# Patient Record
Sex: MALE | Race: WHITE | Employment: FULL TIME | URBAN - METROPOLITAN AREA
[De-identification: names, ages, dates, MRNs, and addresses within clinical notes are randomized per-mention and may not be internally consistent; named-entity substitution may affect disease eponyms.]

---

## 2020-06-12 ENCOUNTER — HOSPITAL ENCOUNTER (OUTPATIENT)
Dept: PREADMISSION TESTING | Age: 55
Discharge: HOME | End: 2020-06-12
Attending: ORTHOPAEDIC SURGERY
Payer: COMMERCIAL

## 2020-06-12 LAB
ALBUMIN SERPL-MCNC: 3.4 G/DL (ref 3.4–5)
ALBUMIN/GLOB SERPL: 0.9 {RATIO} (ref 0.8–1.7)
ALP SERPL-CCNC: 106 U/L (ref 45–117)
ALT SERPL-CCNC: 25 U/L (ref 16–61)
ANION GAP SERPL CALC-SCNC: 6 MMOL/L (ref 3–18)
APTT PPP: 31.7 SEC (ref 23–36.4)
AST SERPL-CCNC: 15 U/L (ref 10–38)
ATRIAL RATE: 83 BPM
BASOPHILS # BLD: 0 K/UL (ref 0–0.1)
BASOPHILS NFR BLD: 0 % (ref 0–2)
BILIRUB SERPL-MCNC: 0.4 MG/DL (ref 0.2–1)
BUN SERPL-MCNC: 14 MG/DL (ref 7–18)
BUN/CREAT SERPL: 64 (ref 12–20)
CALCIUM SERPL-MCNC: 8.6 MG/DL (ref 8.5–10.1)
CALCULATED P AXIS, ECG09: 56 DEGREES
CALCULATED R AXIS, ECG10: 60 DEGREES
CALCULATED T AXIS, ECG11: 18 DEGREES
CHLORIDE SERPL-SCNC: 106 MMOL/L (ref 100–111)
CO2 SERPL-SCNC: 25 MMOL/L (ref 21–32)
CREAT SERPL-MCNC: 0.22 MG/DL (ref 0.6–1.3)
DIAGNOSIS, 93000: NORMAL
DIFFERENTIAL METHOD BLD: ABNORMAL
EOSINOPHIL # BLD: 0.2 K/UL (ref 0–0.4)
EOSINOPHIL NFR BLD: 3 % (ref 0–5)
ERYTHROCYTE [DISTWIDTH] IN BLOOD BY AUTOMATED COUNT: 16.9 % (ref 11.6–14.5)
EST. AVERAGE GLUCOSE BLD GHB EST-MCNC: 143 MG/DL
GLOBULIN SER CALC-MCNC: 3.8 G/DL (ref 2–4)
GLUCOSE SERPL-MCNC: 130 MG/DL (ref 74–99)
HBA1C MFR BLD: 6.6 % (ref 4.2–5.6)
HCT VFR BLD AUTO: 41.7 % (ref 36–48)
HGB BLD-MCNC: 13.2 G/DL (ref 13–16)
INR PPP: 1.1 (ref 0.8–1.2)
LYMPHOCYTES # BLD: 1.2 K/UL (ref 0.9–3.6)
LYMPHOCYTES NFR BLD: 14 % (ref 21–52)
MCH RBC QN AUTO: 26.8 PG (ref 24–34)
MCHC RBC AUTO-ENTMCNC: 31.7 G/DL (ref 31–37)
MCV RBC AUTO: 84.8 FL (ref 74–97)
MONOCYTES # BLD: 0.3 K/UL (ref 0.05–1.2)
MONOCYTES NFR BLD: 4 % (ref 3–10)
NEUTS SEG # BLD: 6.7 K/UL (ref 1.8–8)
NEUTS SEG NFR BLD: 79 % (ref 40–73)
P-R INTERVAL, ECG05: 156 MS
PLATELET # BLD AUTO: 300 K/UL (ref 135–420)
PMV BLD AUTO: 10.2 FL (ref 9.2–11.8)
POTASSIUM SERPL-SCNC: 4.3 MMOL/L (ref 3.5–5.5)
PROT SERPL-MCNC: 7.2 G/DL (ref 6.4–8.2)
PROTHROMBIN TIME: 13.9 SEC (ref 11.5–15.2)
Q-T INTERVAL, ECG07: 384 MS
QRS DURATION, ECG06: 82 MS
QTC CALCULATION (BEZET), ECG08: 451 MS
RBC # BLD AUTO: 4.92 M/UL (ref 4.7–5.5)
SODIUM SERPL-SCNC: 137 MMOL/L (ref 136–145)
VENTRICULAR RATE, ECG03: 83 BPM
WBC # BLD AUTO: 8.4 K/UL (ref 4.6–13.2)

## 2020-06-12 PROCEDURE — 85025 COMPLETE CBC W/AUTO DIFF WBC: CPT

## 2020-06-12 PROCEDURE — 80053 COMPREHEN METABOLIC PANEL: CPT

## 2020-06-12 PROCEDURE — 36415 COLL VENOUS BLD VENIPUNCTURE: CPT

## 2020-06-12 PROCEDURE — 83036 HEMOGLOBIN GLYCOSYLATED A1C: CPT

## 2020-06-12 PROCEDURE — 85730 THROMBOPLASTIN TIME PARTIAL: CPT

## 2020-06-12 PROCEDURE — 93005 ELECTROCARDIOGRAM TRACING: CPT

## 2020-06-12 PROCEDURE — 85610 PROTHROMBIN TIME: CPT

## 2020-06-13 LAB
BACTERIA SPEC CULT: NORMAL
BACTERIA SPEC CULT: NORMAL
SERVICE CMNT-IMP: NORMAL

## 2020-06-22 ENCOUNTER — HOSPITAL ENCOUNTER (OUTPATIENT)
Dept: PREADMISSION TESTING | Age: 55
Discharge: HOME | End: 2020-06-22
Payer: COMMERCIAL

## 2020-06-22 PROCEDURE — 87635 SARS-COV-2 COVID-19 AMP PRB: CPT

## 2020-06-23 LAB — SARS-COV-2, COV2NT: NOT DETECTED

## 2020-06-29 ENCOUNTER — APPOINTMENT (OUTPATIENT)
Dept: GENERAL RADIOLOGY | Age: 55
End: 2020-06-29
Attending: ORTHOPAEDIC SURGERY
Payer: COMMERCIAL

## 2020-06-29 ENCOUNTER — ANESTHESIA EVENT (OUTPATIENT)
Dept: SURGERY | Age: 55
End: 2020-06-29
Payer: COMMERCIAL

## 2020-06-29 ENCOUNTER — ANESTHESIA (OUTPATIENT)
Dept: SURGERY | Age: 55
End: 2020-06-29
Payer: COMMERCIAL

## 2020-06-29 ENCOUNTER — HOSPITAL ENCOUNTER (OUTPATIENT)
Age: 55
Setting detail: OBSERVATION
Discharge: HOME | End: 2020-06-30
Attending: ORTHOPAEDIC SURGERY | Admitting: ORTHOPAEDIC SURGERY
Payer: COMMERCIAL

## 2020-06-29 DIAGNOSIS — G95.9 CERVICAL MYELOPATHY (HCC): Primary | ICD-10-CM

## 2020-06-29 LAB
ABO + RH BLD: NORMAL
BLOOD GROUP ANTIBODIES SERPL: NORMAL
GLUCOSE BLD STRIP.AUTO-MCNC: 137 MG/DL (ref 70–110)
SPECIMEN EXP DATE BLD: NORMAL

## 2020-06-29 PROCEDURE — 77030018836 HC SOL IRR NACL ICUM -A: Performed by: ORTHOPAEDIC SURGERY

## 2020-06-29 PROCEDURE — 77030034479 HC ADH SKN CLSR PRINEO J&J -B: Performed by: ORTHOPAEDIC SURGERY

## 2020-06-29 PROCEDURE — 77030031139 HC SUT VCRL2 J&J -A: Performed by: ORTHOPAEDIC SURGERY

## 2020-06-29 PROCEDURE — 77030002916 HC SUT ETHLN J&J -A: Performed by: ORTHOPAEDIC SURGERY

## 2020-06-29 PROCEDURE — 77030008683 HC TU ET CUF COVD -A: Performed by: SPECIALIST

## 2020-06-29 PROCEDURE — 77030009863 HC PIN CERV DISTR SYNT -B: Performed by: ORTHOPAEDIC SURGERY

## 2020-06-29 PROCEDURE — 97163 PT EVAL HIGH COMPLEX 45 MIN: CPT

## 2020-06-29 PROCEDURE — 77030006643: Performed by: SPECIALIST

## 2020-06-29 PROCEDURE — 77030040361 HC SLV COMPR DVT MDII -B: Performed by: ORTHOPAEDIC SURGERY

## 2020-06-29 PROCEDURE — 77030004402 HC BUR NEUR STRY -C: Performed by: ORTHOPAEDIC SURGERY

## 2020-06-29 PROCEDURE — 77030040506 HC DRN WND MDII -A: Performed by: ORTHOPAEDIC SURGERY

## 2020-06-29 PROCEDURE — 77030014647 HC SEAL FBRN TISSL BAXT -D: Performed by: ORTHOPAEDIC SURGERY

## 2020-06-29 PROCEDURE — 77030014650 HC SEAL MTRX FLOSEL BAXT -C: Performed by: ORTHOPAEDIC SURGERY

## 2020-06-29 PROCEDURE — 74011250636 HC RX REV CODE- 250/636: Performed by: ORTHOPAEDIC SURGERY

## 2020-06-29 PROCEDURE — 86900 BLOOD TYPING SEROLOGIC ABO: CPT

## 2020-06-29 PROCEDURE — 74011250637 HC RX REV CODE- 250/637: Performed by: ORTHOPAEDIC SURGERY

## 2020-06-29 PROCEDURE — 74011250636 HC RX REV CODE- 250/636: Performed by: SPECIALIST

## 2020-06-29 PROCEDURE — 77030039971 HC GRFT BN SUB PRIME HD MUSC -F: Performed by: ORTHOPAEDIC SURGERY

## 2020-06-29 PROCEDURE — 77030014008 HC SPNG HEMSTAT J&J -C: Performed by: ORTHOPAEDIC SURGERY

## 2020-06-29 PROCEDURE — 77030020782 HC GWN BAIR PAWS FLX 3M -B: Performed by: ORTHOPAEDIC SURGERY

## 2020-06-29 PROCEDURE — 82962 GLUCOSE BLOOD TEST: CPT

## 2020-06-29 PROCEDURE — 74011000272 HC RX REV CODE- 272: Performed by: ORTHOPAEDIC SURGERY

## 2020-06-29 PROCEDURE — 76210000016 HC OR PH I REC 1 TO 1.5 HR: Performed by: ORTHOPAEDIC SURGERY

## 2020-06-29 PROCEDURE — 77030020010 HC FCPS BPLR DISP INLC -E: Performed by: ORTHOPAEDIC SURGERY

## 2020-06-29 PROCEDURE — 97535 SELF CARE MNGMENT TRAINING: CPT

## 2020-06-29 PROCEDURE — 97167 OT EVAL HIGH COMPLEX 60 MIN: CPT

## 2020-06-29 PROCEDURE — 77030040504 HC DRN WND MDII -B: Performed by: ORTHOPAEDIC SURGERY

## 2020-06-29 PROCEDURE — C1713 ANCHOR/SCREW BN/BN,TIS/BN: HCPCS | Performed by: ORTHOPAEDIC SURGERY

## 2020-06-29 PROCEDURE — 76060000039 HC ANESTHESIA 4 TO 4.5 HR: Performed by: ORTHOPAEDIC SURGERY

## 2020-06-29 PROCEDURE — 77030008477 HC STYL SATN SLP COVD -A: Performed by: SPECIALIST

## 2020-06-29 PROCEDURE — 77030003029 HC SUT VCRL J&J -B: Performed by: ORTHOPAEDIC SURGERY

## 2020-06-29 PROCEDURE — 77030011267 HC ELECTRD BLD COVD -A: Performed by: ORTHOPAEDIC SURGERY

## 2020-06-29 PROCEDURE — 74011000250 HC RX REV CODE- 250: Performed by: NURSE ANESTHETIST, CERTIFIED REGISTERED

## 2020-06-29 PROCEDURE — 99218 HC RM OBSERVATION: CPT

## 2020-06-29 PROCEDURE — 74011000250 HC RX REV CODE- 250: Performed by: ORTHOPAEDIC SURGERY

## 2020-06-29 PROCEDURE — 77030008462 HC STPLR SKN PROX J&J -A: Performed by: ORTHOPAEDIC SURGERY

## 2020-06-29 PROCEDURE — 77030004435 HC BUR RND STRY -C: Performed by: ORTHOPAEDIC SURGERY

## 2020-06-29 PROCEDURE — 76010000135 HC OR TIME 4 TO 4.5 HR: Performed by: ORTHOPAEDIC SURGERY

## 2020-06-29 PROCEDURE — 77030014007 HC SPNG HEMSTAT J&J -B: Performed by: ORTHOPAEDIC SURGERY

## 2020-06-29 PROCEDURE — 77030036881: Performed by: ORTHOPAEDIC SURGERY

## 2020-06-29 PROCEDURE — 97530 THERAPEUTIC ACTIVITIES: CPT

## 2020-06-29 PROCEDURE — 77030011264 HC ELECTRD BLD EXT COVD -A: Performed by: ORTHOPAEDIC SURGERY

## 2020-06-29 PROCEDURE — 74011000258 HC RX REV CODE- 258: Performed by: NURSE ANESTHETIST, CERTIFIED REGISTERED

## 2020-06-29 PROCEDURE — 74011250636 HC RX REV CODE- 250/636: Performed by: NURSE ANESTHETIST, CERTIFIED REGISTERED

## 2020-06-29 PROCEDURE — 77030002933 HC SUT MCRYL J&J -A: Performed by: ORTHOPAEDIC SURGERY

## 2020-06-29 DEVICE — GRAFT BONE SUB 5CC DEMIN BONE MTRX PRIM HD: Type: IMPLANTABLE DEVICE | Site: SPINE CERVICAL | Status: FUNCTIONAL

## 2020-06-29 DEVICE — SCREW SPNL L14MM DIA4MM ANT CERV TI SELF DRL VAR ANG FULL: Type: IMPLANTABLE DEVICE | Site: SPINE CERVICAL | Status: FUNCTIONAL

## 2020-06-29 DEVICE — GRAFT SPNL SPCR W145XH7XL12MM 7DEG CERV LORDOSIS PRESERVON: Type: IMPLANTABLE DEVICE | Site: SPINE CERVICAL | Status: FUNCTIONAL

## 2020-06-29 RX ORDER — LIDOCAINE HYDROCHLORIDE 20 MG/ML
INJECTION, SOLUTION EPIDURAL; INFILTRATION; INTRACAUDAL; PERINEURAL AS NEEDED
Status: DISCONTINUED | OUTPATIENT
Start: 2020-06-29 | End: 2020-06-29 | Stop reason: HOSPADM

## 2020-06-29 RX ORDER — OXYCODONE HYDROCHLORIDE 5 MG/1
5 TABLET ORAL EVERY 4 HOURS
Status: DISCONTINUED | OUTPATIENT
Start: 2020-06-29 | End: 2020-06-30 | Stop reason: HOSPADM

## 2020-06-29 RX ORDER — SODIUM CHLORIDE, SODIUM LACTATE, POTASSIUM CHLORIDE, CALCIUM CHLORIDE 600; 310; 30; 20 MG/100ML; MG/100ML; MG/100ML; MG/100ML
125 INJECTION, SOLUTION INTRAVENOUS CONTINUOUS
Status: DISCONTINUED | OUTPATIENT
Start: 2020-06-29 | End: 2020-06-30 | Stop reason: HOSPADM

## 2020-06-29 RX ORDER — METHYLPREDNISOLONE ACETATE 80 MG/ML
INJECTION, SUSPENSION INTRA-ARTICULAR; INTRALESIONAL; INTRAMUSCULAR; SOFT TISSUE AS NEEDED
Status: DISCONTINUED | OUTPATIENT
Start: 2020-06-29 | End: 2020-06-29 | Stop reason: HOSPADM

## 2020-06-29 RX ORDER — ACETAMINOPHEN 325 MG/1
650 TABLET ORAL EVERY 4 HOURS
Status: DISCONTINUED | OUTPATIENT
Start: 2020-06-29 | End: 2020-06-30 | Stop reason: HOSPADM

## 2020-06-29 RX ORDER — ONDANSETRON 2 MG/ML
INJECTION INTRAMUSCULAR; INTRAVENOUS AS NEEDED
Status: DISCONTINUED | OUTPATIENT
Start: 2020-06-29 | End: 2020-06-29 | Stop reason: HOSPADM

## 2020-06-29 RX ORDER — MORPHINE SULFATE 2 MG/ML
2 INJECTION, SOLUTION INTRAMUSCULAR; INTRAVENOUS
Status: DISCONTINUED | OUTPATIENT
Start: 2020-06-29 | End: 2020-06-30 | Stop reason: HOSPADM

## 2020-06-29 RX ORDER — HYDROMORPHONE HYDROCHLORIDE 2 MG/ML
0.5 INJECTION, SOLUTION INTRAMUSCULAR; INTRAVENOUS; SUBCUTANEOUS
Status: DISCONTINUED | OUTPATIENT
Start: 2020-06-29 | End: 2020-06-30 | Stop reason: HOSPADM

## 2020-06-29 RX ORDER — SODIUM CHLORIDE 0.9 % (FLUSH) 0.9 %
5-40 SYRINGE (ML) INJECTION AS NEEDED
Status: DISCONTINUED | OUTPATIENT
Start: 2020-06-29 | End: 2020-06-30 | Stop reason: HOSPADM

## 2020-06-29 RX ORDER — GLYCOPYRROLATE 0.2 MG/ML
INJECTION INTRAMUSCULAR; INTRAVENOUS AS NEEDED
Status: DISCONTINUED | OUTPATIENT
Start: 2020-06-29 | End: 2020-06-29 | Stop reason: HOSPADM

## 2020-06-29 RX ORDER — ONDANSETRON 8 MG/1
4 TABLET, ORALLY DISINTEGRATING ORAL
Qty: 30 TAB | Refills: 0 | Status: SHIPPED | OUTPATIENT
Start: 2020-06-29

## 2020-06-29 RX ORDER — ONDANSETRON 2 MG/ML
4 INJECTION INTRAMUSCULAR; INTRAVENOUS ONCE
Status: COMPLETED | OUTPATIENT
Start: 2020-06-29 | End: 2020-06-29

## 2020-06-29 RX ORDER — NEOSTIGMINE METHYLSULFATE 1 MG/ML
INJECTION, SOLUTION INTRAVENOUS AS NEEDED
Status: DISCONTINUED | OUTPATIENT
Start: 2020-06-29 | End: 2020-06-29 | Stop reason: HOSPADM

## 2020-06-29 RX ORDER — DOCUSATE SODIUM 100 MG/1
100 CAPSULE, LIQUID FILLED ORAL 2 TIMES DAILY
Status: DISCONTINUED | OUTPATIENT
Start: 2020-06-29 | End: 2020-06-30 | Stop reason: HOSPADM

## 2020-06-29 RX ORDER — OXYCODONE AND ACETAMINOPHEN 5; 325 MG/1; MG/1
1 TABLET ORAL AS NEEDED
Status: DISCONTINUED | OUTPATIENT
Start: 2020-06-29 | End: 2020-06-30 | Stop reason: HOSPADM

## 2020-06-29 RX ORDER — ONDANSETRON 2 MG/ML
4 INJECTION INTRAMUSCULAR; INTRAVENOUS
Status: DISCONTINUED | OUTPATIENT
Start: 2020-06-29 | End: 2020-06-30 | Stop reason: HOSPADM

## 2020-06-29 RX ORDER — FENTANYL CITRATE 50 UG/ML
INJECTION, SOLUTION INTRAMUSCULAR; INTRAVENOUS AS NEEDED
Status: DISCONTINUED | OUTPATIENT
Start: 2020-06-29 | End: 2020-06-29 | Stop reason: HOSPADM

## 2020-06-29 RX ORDER — PROPOFOL 10 MG/ML
INJECTION, EMULSION INTRAVENOUS
Status: DISCONTINUED | OUTPATIENT
Start: 2020-06-29 | End: 2020-06-29 | Stop reason: HOSPADM

## 2020-06-29 RX ORDER — GABAPENTIN 300 MG/1
300 CAPSULE ORAL 3 TIMES DAILY
Qty: 90 CAP | Refills: 0 | Status: SHIPPED | OUTPATIENT
Start: 2020-06-29

## 2020-06-29 RX ORDER — NALOXONE HYDROCHLORIDE 0.4 MG/ML
0.4 INJECTION, SOLUTION INTRAMUSCULAR; INTRAVENOUS; SUBCUTANEOUS AS NEEDED
Status: DISCONTINUED | OUTPATIENT
Start: 2020-06-29 | End: 2020-06-30 | Stop reason: HOSPADM

## 2020-06-29 RX ORDER — DIPHENHYDRAMINE HYDROCHLORIDE 50 MG/ML
12.5 INJECTION, SOLUTION INTRAMUSCULAR; INTRAVENOUS
Status: DISCONTINUED | OUTPATIENT
Start: 2020-06-29 | End: 2020-06-30 | Stop reason: HOSPADM

## 2020-06-29 RX ORDER — MIDAZOLAM HYDROCHLORIDE 1 MG/ML
INJECTION, SOLUTION INTRAMUSCULAR; INTRAVENOUS AS NEEDED
Status: DISCONTINUED | OUTPATIENT
Start: 2020-06-29 | End: 2020-06-29 | Stop reason: HOSPADM

## 2020-06-29 RX ORDER — SODIUM CHLORIDE 0.9 % (FLUSH) 0.9 %
5-40 SYRINGE (ML) INJECTION EVERY 8 HOURS
Status: DISCONTINUED | OUTPATIENT
Start: 2020-06-29 | End: 2020-06-30 | Stop reason: HOSPADM

## 2020-06-29 RX ORDER — PROPOFOL 10 MG/ML
INJECTION, EMULSION INTRAVENOUS AS NEEDED
Status: DISCONTINUED | OUTPATIENT
Start: 2020-06-29 | End: 2020-06-29 | Stop reason: HOSPADM

## 2020-06-29 RX ORDER — FENTANYL CITRATE 50 UG/ML
25 INJECTION, SOLUTION INTRAMUSCULAR; INTRAVENOUS
Status: DISCONTINUED | OUTPATIENT
Start: 2020-06-29 | End: 2020-06-30 | Stop reason: HOSPADM

## 2020-06-29 RX ORDER — BUPIVACAINE HYDROCHLORIDE AND EPINEPHRINE 5; 5 MG/ML; UG/ML
INJECTION, SOLUTION EPIDURAL; INTRACAUDAL; PERINEURAL AS NEEDED
Status: DISCONTINUED | OUTPATIENT
Start: 2020-06-29 | End: 2020-06-29 | Stop reason: HOSPADM

## 2020-06-29 RX ORDER — DEXAMETHASONE SODIUM PHOSPHATE 4 MG/ML
4 INJECTION, SOLUTION INTRA-ARTICULAR; INTRALESIONAL; INTRAMUSCULAR; INTRAVENOUS; SOFT TISSUE EVERY 8 HOURS
Status: COMPLETED | OUTPATIENT
Start: 2020-06-29 | End: 2020-06-30

## 2020-06-29 RX ORDER — VANCOMYCIN 1.75 GRAM/500 ML IN 0.9 % SODIUM CHLORIDE INTRAVENOUS
1750 EVERY 24 HOURS
Status: COMPLETED | OUTPATIENT
Start: 2020-06-30 | End: 2020-06-30

## 2020-06-29 RX ORDER — NALOXONE HYDROCHLORIDE 0.4 MG/ML
0.1 INJECTION, SOLUTION INTRAMUSCULAR; INTRAVENOUS; SUBCUTANEOUS
Status: DISCONTINUED | OUTPATIENT
Start: 2020-06-29 | End: 2020-06-30 | Stop reason: HOSPADM

## 2020-06-29 RX ORDER — VANCOMYCIN 1.75 GRAM/500 ML IN 0.9 % SODIUM CHLORIDE INTRAVENOUS
1750 ONCE
Status: COMPLETED | OUTPATIENT
Start: 2020-06-29 | End: 2020-06-29

## 2020-06-29 RX ORDER — OXYCODONE HYDROCHLORIDE 5 MG/1
5 TABLET ORAL
Qty: 28 TAB | Refills: 0 | Status: SHIPPED | OUTPATIENT
Start: 2020-06-29 | End: 2020-07-06

## 2020-06-29 RX ORDER — DEXAMETHASONE SODIUM PHOSPHATE 4 MG/ML
INJECTION, SOLUTION INTRA-ARTICULAR; INTRALESIONAL; INTRAMUSCULAR; INTRAVENOUS; SOFT TISSUE AS NEEDED
Status: DISCONTINUED | OUTPATIENT
Start: 2020-06-29 | End: 2020-06-29 | Stop reason: HOSPADM

## 2020-06-29 RX ORDER — SODIUM CHLORIDE, SODIUM LACTATE, POTASSIUM CHLORIDE, CALCIUM CHLORIDE 600; 310; 30; 20 MG/100ML; MG/100ML; MG/100ML; MG/100ML
50 INJECTION, SOLUTION INTRAVENOUS CONTINUOUS
Status: DISCONTINUED | OUTPATIENT
Start: 2020-06-29 | End: 2020-06-30 | Stop reason: HOSPADM

## 2020-06-29 RX ORDER — ACETAMINOPHEN 325 MG/1
650 TABLET ORAL EVERY 6 HOURS
Qty: 168 TAB | Refills: 0 | Status: SHIPPED | OUTPATIENT
Start: 2020-06-29 | End: 2020-07-20

## 2020-06-29 RX ORDER — ROCURONIUM BROMIDE 10 MG/ML
INJECTION, SOLUTION INTRAVENOUS AS NEEDED
Status: DISCONTINUED | OUTPATIENT
Start: 2020-06-29 | End: 2020-06-29 | Stop reason: HOSPADM

## 2020-06-29 RX ORDER — CYCLOBENZAPRINE HCL 10 MG
5 TABLET ORAL
Qty: 90 TAB | Refills: 0 | Status: SHIPPED | OUTPATIENT
Start: 2020-06-29

## 2020-06-29 RX ORDER — HYDROMORPHONE HYDROCHLORIDE 2 MG/ML
INJECTION, SOLUTION INTRAMUSCULAR; INTRAVENOUS; SUBCUTANEOUS AS NEEDED
Status: DISCONTINUED | OUTPATIENT
Start: 2020-06-29 | End: 2020-06-29 | Stop reason: HOSPADM

## 2020-06-29 RX ADMIN — FENTANYL CITRATE 25 MCG: 50 INJECTION, SOLUTION INTRAMUSCULAR; INTRAVENOUS at 09:24

## 2020-06-29 RX ADMIN — Medication 1 MG: at 11:16

## 2020-06-29 RX ADMIN — ACETAMINOPHEN 650 MG: 325 TABLET ORAL at 19:24

## 2020-06-29 RX ADMIN — SODIUM CHLORIDE, SODIUM LACTATE, POTASSIUM CHLORIDE, AND CALCIUM CHLORIDE: 600; 310; 30; 20 INJECTION, SOLUTION INTRAVENOUS at 08:20

## 2020-06-29 RX ADMIN — LIDOCAINE HYDROCHLORIDE 60 MG: 20 INJECTION, SOLUTION EPIDURAL; INFILTRATION; INTRACAUDAL; PERINEURAL at 07:51

## 2020-06-29 RX ADMIN — VANCOMYCIN HYDROCHLORIDE 1.75 G: 10 INJECTION, POWDER, LYOPHILIZED, FOR SOLUTION INTRAVENOUS at 08:24

## 2020-06-29 RX ADMIN — HYDROMORPHONE HYDROCHLORIDE 1 MG: 2 INJECTION, SOLUTION INTRAMUSCULAR; INTRAVENOUS; SUBCUTANEOUS at 07:54

## 2020-06-29 RX ADMIN — PHENYLEPHRINE HYDROCHLORIDE 20 MCG/MIN: 10 INJECTION INTRAVENOUS at 09:08

## 2020-06-29 RX ADMIN — FENTANYL CITRATE 25 MCG: 50 INJECTION, SOLUTION INTRAMUSCULAR; INTRAVENOUS at 11:05

## 2020-06-29 RX ADMIN — OXYCODONE 5 MG: 5 TABLET ORAL at 15:05

## 2020-06-29 RX ADMIN — DEXAMETHASONE SODIUM PHOSPHATE 4 MG: 4 INJECTION, SOLUTION INTRAMUSCULAR; INTRAVENOUS at 23:08

## 2020-06-29 RX ADMIN — ROCURONIUM BROMIDE 30 MG: 10 INJECTION, SOLUTION INTRAVENOUS at 07:51

## 2020-06-29 RX ADMIN — PROPOFOL 50 MG: 10 INJECTION, EMULSION INTRAVENOUS at 07:52

## 2020-06-29 RX ADMIN — DEXAMETHASONE SODIUM PHOSPHATE 8 MG: 4 INJECTION, SOLUTION INTRAMUSCULAR; INTRAVENOUS at 08:23

## 2020-06-29 RX ADMIN — GLYCOPYRROLATE 0.2 MG: 0.2 INJECTION INTRAMUSCULAR; INTRAVENOUS at 11:16

## 2020-06-29 RX ADMIN — FENTANYL CITRATE 25 MCG: 50 INJECTION, SOLUTION INTRAMUSCULAR; INTRAVENOUS at 12:20

## 2020-06-29 RX ADMIN — OXYCODONE 5 MG: 5 TABLET ORAL at 23:07

## 2020-06-29 RX ADMIN — PHENYLEPHRINE HYDROCHLORIDE 100 MCG: 10 INJECTION INTRAVENOUS at 08:57

## 2020-06-29 RX ADMIN — PHENYLEPHRINE HYDROCHLORIDE 100 MCG: 10 INJECTION INTRAVENOUS at 08:47

## 2020-06-29 RX ADMIN — DOCUSATE SODIUM 100 MG: 100 CAPSULE, LIQUID FILLED ORAL at 23:08

## 2020-06-29 RX ADMIN — FENTANYL CITRATE 25 MCG: 50 INJECTION, SOLUTION INTRAMUSCULAR; INTRAVENOUS at 12:05

## 2020-06-29 RX ADMIN — ACETAMINOPHEN 650 MG: 325 TABLET ORAL at 15:05

## 2020-06-29 RX ADMIN — MIDAZOLAM 2 MG: 1 INJECTION INTRAMUSCULAR; INTRAVENOUS at 07:38

## 2020-06-29 RX ADMIN — DEXAMETHASONE SODIUM PHOSPHATE 4 MG: 4 INJECTION, SOLUTION INTRAMUSCULAR; INTRAVENOUS at 14:29

## 2020-06-29 RX ADMIN — FENTANYL CITRATE 25 MCG: 50 INJECTION, SOLUTION INTRAMUSCULAR; INTRAVENOUS at 08:35

## 2020-06-29 RX ADMIN — ACETAMINOPHEN 650 MG: 325 TABLET ORAL at 23:07

## 2020-06-29 RX ADMIN — PHENYLEPHRINE HYDROCHLORIDE 100 MCG: 10 INJECTION INTRAVENOUS at 08:18

## 2020-06-29 RX ADMIN — MIDAZOLAM 2 MG: 1 INJECTION INTRAMUSCULAR; INTRAVENOUS at 07:36

## 2020-06-29 RX ADMIN — ONDANSETRON 4 MG: 2 INJECTION INTRAMUSCULAR; INTRAVENOUS at 12:10

## 2020-06-29 RX ADMIN — PHENYLEPHRINE HYDROCHLORIDE 100 MCG: 10 INJECTION INTRAVENOUS at 08:29

## 2020-06-29 RX ADMIN — PROPOFOL 100 MCG/KG/MIN: 10 INJECTION, EMULSION INTRAVENOUS at 07:49

## 2020-06-29 RX ADMIN — GLYCOPYRROLATE 0.2 MG: 0.2 INJECTION INTRAMUSCULAR; INTRAVENOUS at 07:36

## 2020-06-29 RX ADMIN — FENTANYL CITRATE 25 MCG: 50 INJECTION, SOLUTION INTRAMUSCULAR; INTRAVENOUS at 11:42

## 2020-06-29 RX ADMIN — PHENYLEPHRINE HYDROCHLORIDE 100 MCG: 10 INJECTION INTRAVENOUS at 08:40

## 2020-06-29 RX ADMIN — SODIUM CHLORIDE, SODIUM LACTATE, POTASSIUM CHLORIDE, AND CALCIUM CHLORIDE 125 ML/HR: 600; 310; 30; 20 INJECTION, SOLUTION INTRAVENOUS at 07:11

## 2020-06-29 RX ADMIN — ONDANSETRON HYDROCHLORIDE 4 MG: 2 INJECTION INTRAMUSCULAR; INTRAVENOUS at 11:13

## 2020-06-29 RX ADMIN — FENTANYL CITRATE 50 MCG: 50 INJECTION, SOLUTION INTRAMUSCULAR; INTRAVENOUS at 07:47

## 2020-06-29 RX ADMIN — PROPOFOL 150 MG: 10 INJECTION, EMULSION INTRAVENOUS at 07:51

## 2020-06-29 RX ADMIN — OXYCODONE 5 MG: 5 TABLET ORAL at 19:24

## 2020-06-29 RX ADMIN — FENTANYL CITRATE 50 MCG: 50 INJECTION, SOLUTION INTRAMUSCULAR; INTRAVENOUS at 07:44

## 2020-06-29 RX ADMIN — SODIUM CHLORIDE, SODIUM LACTATE, POTASSIUM CHLORIDE, AND CALCIUM CHLORIDE: 600; 310; 30; 20 INJECTION, SOLUTION INTRAVENOUS at 11:27

## 2020-06-29 NOTE — PROGRESS NOTES
[address] [facility]  [address] care of pt at this time. Assessment complete. Pt alert and oriented x 4. Shows no sign of distress. Fall risk arm band in place. Denies SOB and chest pain. Pt lungs clear bilaterally. Cap refill  less than 3 seconds. Pt denies numbness and tingling to all extremities. But has extreme weakness to all extremities. Pt unable to lift arms but can grasp nurse hands. Stated pain 2/10. Pt has 18 G IV to L forearm. Pt has ioban dressing to anterior neck CDI Dean drain in place charged. SCDS in place to [facility] and pt own personal TEDs stocking in place . Incentive spirometer at bedside. Pt encouraged to continue use of IS. Pt verbalized understanding. Ice pack applied. Call light and possessions within reach. Bed locked and in low position. Will continue to monitor. [address] with Dr Yves Madrid in regard to pt status. Informed Dr Yves Madrid that pt would feel more comfortable d/c home poncho.  OK with Dr Yves Madrid

## 2020-06-29 NOTE — DISCHARGE SUMMARY
Discharge Summary     Patient: Lakhwinder Henry MRN: [id number]  SSN: [id number]    YOB: 1965  Age: 49 y.o. Sex: male       Admit Date: 6/29/2020    Discharge Date: 6/29/2020      Admission Diagnoses: Cervical myelopathy ([facility] [G95.9]    Discharge Diagnoses:   Problem List as of 6/29/2020 Date Reviewed: 6/29/2020          Codes Class Noted - Resolved    Cervical myelopathy ([facility] ICD-10-CM: G95.9  ICD-9-CM: 721.1  6/29/2020 - Present        Orthostatic hypotension ICD-10-CM: I95.1  ICD-9-CM: 458.0  4/13/2011 - Present    Overview Signed 10/7/2011 11:39 AM by Danielle Lopez LPN     1/[id number]/[id number] LVEF 60%             Venous insufficiency (chronic) (peripheral) ICD-10-CM: I87.2  ICD-9-CM: 459.81  1/27/2005 - Present        Muscular dystrophy ([facility] ICD-10-CM: G71.00  ICD-9-CM: 359.1  7/26/1972 - Present    Overview Signed 10/10/2011 11:22 AM by Kalia Peñaloza diagnosed at age [de-identified]. Discharge Condition: Good    Hospital Course: SPINE DISCHARGE SUMMARY      Procedure: ACDF C3-C6    HPI:  This is a 49y.o. year old male that presented to the office with signs and symptoms of cervical myelopathy . They tried and failed conservative treatment measures and wished to proceed with surgical intervention. The risks, benefits, and complications of the procedure were discussed with the patient and informed consent was obtained. Hospital Course: The patient was admitted to the hospital on 6/29/2020 and underwent an uncomplicated ACDF M8-Y0. They were transferred to the PACU after surgery. Their pain was well managed with IV and oral pain medications. All questions were answered to the patients satisfaction. Patient will be discharge to home today  in good condition. Patient is scheduled for followup with me in 2 weeks.       Consults: None    Significant Diagnostic Studies:     Disposition: home    Discharge Medications:   Current Discharge Medication List      START taking these medications    Details   cyclobenzaprine (FLEXERIL) 10 mg tablet Take 0.5 Tabs by mouth three (3) times daily as needed for Muscle Spasm(s). Qty: 90 Tab, Refills: 0      docusate sodium (COLACE) 50 mg capsule Take 1 Cap by mouth two (2) times a day for 90 days. Qty: 60 Cap, Refills: 2      gabapentin (NEURONTIN) 300 mg capsule Take 1 Cap by mouth three (3) times daily. Max Daily Amount: 900 mg. Qty: 90 Cap, Refills: 0    Associated Diagnoses: Cervical myelopathy (HCC)      ondansetron (ZOFRAN ODT) 8 mg disintegrating tablet Take 0.5 Tabs by mouth every eight (8) hours as needed for Nausea. Qty: 30 Tab, Refills: 0      oxyCODONE IR (Roxicodone) 5 mg immediate release tablet Take 1 Tab by mouth every six (6) hours as needed for Pain for up to 7 days. Max Daily Amount: 20 mg.  Qty: 28 Tab, Refills: 0    Associated Diagnoses: Cervical myelopathy (HCC)      acetaminophen (TylenoL) 325 mg tablet Take 2 Tabs by mouth every six (6) hours for 21 days. Qty: 168 Tab, Refills: 0         CONTINUE these medications which have NOT CHANGED    Details   ketoconazole (NIZORAL) 2 % shampoo daily as needed. Activity: Activity as tolerated  Diet: Regular Diet  Wound Care: Keep wound clean and dry    Follow-up Appointments   Procedures    FOLLOW UP VISIT Appointment in: Two Weeks     Standing Status:   Standing     Number of Occurrences:   1     Order Specific Question:   Appointment in     Answer:    Two Weeks       Signed By: Harris Esposito MD     June 29, 2020

## 2020-06-29 NOTE — PROGRESS NOTES
1915 Assumed patient care at this time. Report received from [facility], RN.   4719 Shift assessment completed and documented in flow sheets at this time. 2808 BEN drain removed. Bedside and verbal shift change report given to [facility], RN and Phillip Chamberlain RN (oncoming nurse) by Daniel Selby RN (offgoing nurse). Report included the following information: SBAR, Kardex, MAR, and recent results.

## 2020-06-29 NOTE — ROUTINE PROCESS
Bedside and Verbal shift change report given to RE aTbor RN (oncoming nurse) by Aquilino Purcell RN (offgoing nurse). Report included the following information SBAR, Kardex, MAR and Recent Results.

## 2020-06-29 NOTE — PROGRESS NOTES
Problem: Self Care Deficits Care Plan (Adult)  Goal: *Acute Goals and Plan of Care (Insert Text)  Description: Occupational Therapy Goals  Initiated 6/29/2020 within 7 day(s). 1.  Patient will perform self-feeding with moderate assistance    2. Patient will perform upper body dressing with moderate assistance . 3. Patient will perform toileting seated in w/c using urinal with moderate assistance . 4. Patient will perform upper extremity therapeutic exercise/activities with moderate assistance  for 5 minutes. 5.  Patient will utilize energy conservation techniques during functional activities with 1 verbal cue(s). 6.  Patient will perform grooming tasks seated in w/c with mod A. Outcome: Progressing Towards Goal   OCCUPATIONAL THERAPY EVALUATION    Patient: Xu Ko (20 y.o. male)  Date: 6/29/2020  Primary Diagnosis: Cervical myelopathy ([facility] [G95.9]  Procedure(s) (LRB):  ANTERIOR CERVICAL DISKECTOMY FUSION CERVICAL 3- CERVICAL 6 WITH C-ARM (N/A) Day of Surgery   Precautions: Fall, Spinal, Other (comment)  PLOF: pt living alone, owns power w/c and stays in chair 24/7, has friends/support daily to assist with dressing, toileting, bathing    ASSESSMENT AND RECOMMENDATIONS:  Based on the objective data described below, the patient presents with BUE decreased ROM and strength affecting UE ADLs. Pt seen with PT for additional set of skilled hands. Pt demonstrates no AROM in B shoulders/elbows, PROM of B shoulders ~85*. BUE  strength weak (L weaker than R) & sensation intact. Patient reports friends will be available to assist with ADLs and c-collar management. Pt owns reacher, utilizes urinal for toileting, requires assist for bowel hygiene, dressing. Pt uses lift for STS, and only uses for bathing/bowel hygiene. Pt requesting to eat food before attempting to sit up to EOB.  Pt required total A for toileting long supported sitting in bed using urinal. Assisted pt in proper positioning in bed to maximize comfort. Pt requires total A for feeding at this time; pt reports when he is home he is able to prop his elbows on the table and then able to self feed independently. Patient will benefit from skilled Occupational Therapy intervention to maximize safety/independence with ADLs at d/c.    Education: Reviewed Neck/Back Precautions, C-Collar/Brace management, body mechanics, home safety, adaptive strategies and adaptive dressing techniques including clothing modifications with patient verbalizing understanding at this time. Patient will benefit from skilled intervention to address the above impairments. Patient's rehabilitation potential is considered to be Fair  Factors which may influence rehabilitation potential include:   []             None noted  []             Mental ability/status  [x]             Medical condition  [x]             Home/family situation and support systems  []             Safety awareness  []             Pain tolerance/management  []             Other:      PLAN :  Recommendations and Planned Interventions:   [x]               Self Care Training                  [x]      Therapeutic Activities  [x]               Functional Mobility Training   []      Cognitive Retraining  [x]               Therapeutic Exercises           [x]      Endurance Activities  []               Balance Training                    []      Neuromuscular Re-Education  []               Visual/Perceptual Training     [x]      Home Safety Training  [x]               Patient Education                   []      Family Training/Education  []               Other (comment):    Frequency/Duration: Patient will be followed by occupational therapy 1-2 times per day/4-7 days per week to address goals. Discharge Recommendations: Home Health with 24/7 assist vs. rehab  Further Equipment Recommendations for Discharge: N/A     SUBJECTIVE:   Patient stated i've become as independent as I can.     OBJECTIVE DATA SUMMARY: Past Medical History:   Diagnosis Date    Concussion     no loc in teen years    GERD (gastroesophageal reflux disease) 1/27/05    Muscular dystrophy ([facility] 7/26/1972    Obesity 1/27/05     Past Surgical History:   Procedure Laterality Date    HX ORTHOPAEDIC      k nail right and removed    HX ORTHOPAEDIC Right     rodrigue, plate, 8 screws three pins and removed    HX ORTHOPAEDIC Right     skin graft leg    HX TONSILLECTOMY       Barriers to Learning/Limitations: yes;  physical  Compensate with: visual, verbal, tactile, kinesthetic cues/model    Home Situation/Prior Level of Function: pt utilizes power w/c 24/7, reports he is able to feed himself, requires assist for dressing, bowel hygiene, bathing, uses lift for standing  Home Situation  Home Environment: Private residence  Wheelchair Ramp: Yes  One/Two Story Residence: One story  Living Alone: Yes  Support Systems: Family member(s), Friends \ neighbors  Patient Expects to be Discharged to[de-identified] Private residence  Current DME Used/Available at Home: Wheelchair, power, Adaptive dressing aides(standing frame lift)  Tub or Shower Type: Shower(no threshold)  []  Right hand dominant   []  Left hand dominant    Cognitive/Behavioral Status:  Neurologic State: Alert  Orientation Level: Oriented X4  Cognition: Appropriate for age attention/concentration; Follows commands  Safety/Judgement: Awareness of environment    Skin: Anterior cervical incision w/ dressing/Mepilex & BEN drain  Edema: compression hose in place & applied ice     Coordination: BUE  Coordination: Grossly decreased, non-functional  Fine Motor Skills-Upper: Left Impaired;Right Impaired    Gross Motor Skills-Upper: Left Impaired;Right Impaired    Strength: BUE  BUE's not formally assessed, but observed functionally; B  pinch weak  Strength: Grossly decreased, non-functional    Tone & Sensation:BUE  Sensation: Intact    Range of Motion: BUE  AROM: Grossly decreased, non-functional  PROM: Grossly decreased, non-functional    ADL Assessment:  Feeding: Total assistance    Oral Facial Hygiene/Grooming: Total assistance    Bathing: Total assistance    Upper Body Dressing: Total assistance    Lower Body Dressing: Total assistance    Toileting: Total assistance    ADL Intervention:  Feeding  Feeding Assistance: Total assistance (dependent)  Cutting Food: Total assistance (dependent)  Drink to Mouth: Total assistance (dependent)    Upper Body Dressing Assistance  Dressing Assistance: Total assistance(dependent)  Hospital Gown: Total assistance (dependent)    Toileting  Toileting Assistance: Total assistance(dependent)(urinal in bed)  Bladder Hygiene: Total assistance (dependent)    Cognitive Retraining  Safety/Judgement: Awareness of environment    Pain:  Pain level pre-treatment: 4/10  Pain level post-treatment: 4/10  Pain Intervention(s): Medication administer by Nursing (see MAR); Rest, Ice, Repositioning   Response to intervention: Nurse notified, see doc flow sheet    Please refer to the flowsheet for vital signs taken during this treatment. After treatment:   []  Patient left in no apparent distress sitting up in chair  []  Patient sitting on EOB  [x]  Patient left in no apparent distress in bed  [x]  Call bell left within reach  [x]  Nursing notified  []  Caregiver present  [x]  Ice applied  []  SCD's on while back in bed  [] Bed alarm activated    COMMUNICATION/EDUCATION:   Communication/Collaboration:  [x]       Role of Occupational Therapy in the acute care setting. [x]      Home safety education was provided and the patient/caregiver indicated understanding. [x]      Patient/family have participated as able in goal setting and plan of care. [x]      Patient/family agree to work toward stated goals and plan of care. []      Patient understands intent and goals of therapy, but is neutral about his/her participation. []      Patient is unable to participate in plan of care at this time.     Thank you for this referral.  Huy Wagner OTR/L  Time Calculation: 44 mins    Eval Complexity: History: HIGH Complexity : Extensive review of history including physical, cognitive and psychosocial history ; Examination: MEDIUM Complexity : 3-5 performance deficits relating to physical, cognitive , or psychosocial skils that result in activity limitations and / or participation restrictions; Decision Making:HIGH Complexity : Patient presents with comorbidities that affect occupational performance.  Signifigant modification of tasks or assistance (eg, physical or verbal) with assessment (s) is necessary to enable patient to complete evaluation

## 2020-06-29 NOTE — PROGRESS NOTES
Problem: Mobility Impaired (Adult and Pediatric)  Goal: *Acute Goals and Plan of Care (Insert Text)  Description: Physical Therapy Goals   Initiated 6/29/2020 and to be accomplished within 2 day(s)  1. Patient will move from supine <> sit with total assist for change of position. 2.  Patient will perform demonstrate static sitting balance EOB with moderate/max assist.     Outcome: Progressing Towards Goal  PHYSICAL THERAPY EVALUATION    Patient: Leland Campos (89 y.o. male)  Date: 6/29/2020  Primary Diagnosis: Cervical myelopathy (HCC) [G95.9]  Procedure(s) (LRB):  ANTERIOR CERVICAL DISKECTOMY FUSION CERVICAL 3- CERVICAL 6 WITH C-ARM (N/A) Day of Surgery   Precautions: Fall, Spinal, Other (comment)(cervical collaor; drain present)    ASSESSMENT :  Based on the objective data described below, the patient is a 50 yo M s/p surgery as noted above; seen with OT for second set of skilled hands. Pt with h/o MD since child-[place]. Presents with limitations in ROM/motor performance UE's/LE's, and limitations in overall functional mobility. Pt lives alone and reports being w/c dependent (sleeping in and use of motorized specialized w/c with high back rest). Pt states he has friends in and out to assist with care. Has been non ambulatory for some time and assistants use mechanical standing lift when hygiene care needed. Pt  with decreased  strength R stronger than L, otherwise UE's grossly non-functional also. Pt declined supine >sit at this time as pt requesting use of urinal and would like to eat first. OT/PT assisted pt with use of urinal and removal. Pt left with all needs in reach, and nurse Hyde present feeding pt. Pt with pre-existing limitations in functional mobility, however, states UE functional somewhat less since surgery. May benefit from additional sessions of PT to address transition to sit and tolerance for sitting and pt will be w/c level functioning post discharge.  Recommend HHPT for follow up physical therapy upon discharge to promote return to PLOF. Pt Education: Role of physical therapy in acute care setting, fall prevention and safety/technique during functional mobility tasks      Patient will benefit from skilled intervention to address the above impairments. Patients rehabilitation potential is considered to be Fair  Factors which may influence rehabilitation potential include:   []         None noted  []         Mental ability/status  [x]         Medical condition  [x]         Home/family situation and support systems  []         Safety awareness  []         Pain tolerance/management  []         Other:      PLAN :  Recommendations and Planned Interventions:  [x]           Bed Mobility Training             []    Neuromuscular Re-Education  [x]           Transfer Training                   []    Orthotic/Prosthetic Training  [x]           Gait Training                          []    Modalities  [x]           Therapeutic Exercises          []    Edema Management/Control  [x]           Therapeutic Activities            [x]    Patient and Family Training/Education  []           Other (comment):    Frequency/Duration: Patient will be followed by physical therapy 1-2 times per day to address goals. Discharge Recommendations: Home Health   Further Equipment Recommendations for Discharge: N/A     SUBJECTIVE:   Patient stated My mouth is so dry.     OBJECTIVE DATA SUMMARY:     Past Medical History:   Diagnosis Date    Concussion     no loc in teen years    GERD (gastroesophageal reflux disease) 1/27/05    Muscular dystrophy ([facility] 7/26/1972    Obesity 1/27/05     Past Surgical History:   Procedure Laterality Date    HX ORTHOPAEDIC      k nail right and removed    HX ORTHOPAEDIC Right     rodrigue, plate, 8 screws three pins and removed    HX ORTHOPAEDIC Right     skin graft leg    HX TONSILLECTOMY       Barriers to Learning/Limitations: yes;  physical  Compensate with: Verbal Cues and Other physical assist Prior Level of Function/Home Situation: as noted above  Home Situation  Home Environment: Private residence  Wheelchair Ramp: Yes  One/Two Story Residence: One story  Living Alone: Yes  Support Systems: Family member(s), Friends \ neighbors  Patient Expects to be Discharged to[de-identified] Private residence  Current DME Used/[facility] at [facility]: Wheelchair, power, Other (comment)(standing frame lift)  Critical Behavior:  Neurologic State: Alert; Appropriate for age  Orientation Level: Oriented X4  Cognition: Follows commands  Safety/Judgement: Awareness of environment  Psychosocial  Patient Behaviors: Calm; Cooperative  Purposeful Interaction: Yes  Pt Identified Daily Priority: Clinical issues (comment)  Caritas Process: Establish trust;Nurture loving kindness  Caring Interventions: Reassure  Skin Condition/Temp: Dry;Warm  Skin Integrity: Incision (comment); Abrasion  Skin Integumentary  Skin Color: Appropriate for ethnicity  Skin Condition/Temp: Dry;Warm  Skin Integrity: Incision (comment); Abrasion  Turgor: Non-tenting  Strength:    Strength: Grossly decreased, non-functional(LE's)  Tone & Sensation:   Sensation: Intact  Coordination: Grossly decreased non-functional LE's  Range Of Motion:  AROM: Grossly decreased, non-functional(LE's)  PROM: Grossly decreased, non-functional(LE's)  Functional Mobility:  Bed Mobility:  Pt declines supine>sit at this time as he desires to eat first  Therapeutic Exercises:   Fair tolerance for gentle PROM LE's/UE's  Pain:  Pain Scale 1: Numeric (0 - 10)  Pain Intensity 1: 2  Pain Location 1: Neck  Pain Orientation 1: Anterior  Pain Intervention(s) 1: Ice  Activity Tolerance:   Fair   Please refer to the flowsheet for vital signs taken during this treatment.   After treatment:   []         Patient left in no apparent distress sitting up in chair  [x]         Patient left in no apparent distress in bed  [x]         Call bell left within reach  [x]         Nursing notified-nurse Mary Ellen present feeding pt  []         Caregiver present  []         Bed alarm activated    COMMUNICATION/EDUCATION:   [x]         Fall prevention education was provided and the patient/caregiver indicated understanding. [x]         Patient/family have participated as able in goal setting and plan of care. [x]         Patient/family agree to work toward stated goals and plan of care. []         Patient understands intent and goals of therapy, but is neutral about his/her participation. []         Patient is unable to participate in goal setting and plan of care.     Eval Complexity: History: HIGH Complexity :3+ comorbidities / personal factors will impact the outcome/ POC Exam:HIGH Complexity : 4+ Standardized tests and measures addressing body structure, function, activity limitation and / or participation in recreation  Presentation: MEDIUM Complexity : Evolving with changing characteristics  Clinical Decision Making:High Complexity    Overall Complexity:HIGH     Thank you for this referral.  Ernestine Montanez, PT   Time Calculation: 42 mins

## 2020-06-29 NOTE — PERIOP NOTES
Pt transferred via bed to room 212 with attendant; vital signs stable; report off to [facility]. Primary Nurse Merline Sinclair RN and Myrtle Zaman RN performed a dual skin assessment on this patient No new impairment noted, see skin assessment for further details.

## 2020-06-29 NOTE — ANESTHESIA PREPROCEDURE EVALUATION
Relevant Problems   No relevant active problems       Anesthetic History   No history of anesthetic complications            Review of Systems / Medical History  Patient summary reviewed, nursing notes reviewed and pertinent labs reviewed    Pulmonary  Within defined limits                 Neuro/Psych   Within defined limits           Cardiovascular  Within defined limits                Exercise tolerance: >4 METS     GI/Hepatic/Renal  Within defined limits           Pertinent negatives: No GERD   Endo/Other        Obesity  Pertinent negatives: No morbid obesity   Other Findings   Comments: Muscular dystrophy - very mild - diagnosed because family was tested when brother had more obvious presentation           Physical Exam    Airway  Mallampati: II  TM Distance: 4 - 6 cm  Neck ROM: normal range of motion   Mouth opening: Normal     Cardiovascular               Dental    Dentition: Caps/crowns     Pulmonary                 Abdominal         Other Findings            Anesthetic Plan    ASA: 3  Anesthesia type: general          Induction: Intravenous  Anesthetic plan and risks discussed with: Patient      No neuro issues through full range of motion of head and neck. Preexisting bilateral upper extremity weakness.

## 2020-06-29 NOTE — PERIOP NOTES
TRANSFER - OUT REPORT:    Verbal report given to AMOS Hardin RN on Shayne Robles  being transferred to [address] [place] [address] for routine progression of care       Report consisted of patients Situation, Background, Assessment and   Recommendations(SBAR). Information from the following report(s) SBAR was reviewed with the receiving nurse. Lines:   Peripheral IV 06/29/20 Left Forearm (Active)   Site Assessment Clean, dry, & intact 6/29/2020  7:10 AM   Phlebitis Assessment 0 6/29/2020  7:10 AM   Infiltration Assessment 0 6/29/2020  7:10 AM   Dressing Status Clean, dry, & intact; New 6/29/2020  7:10 AM   Dressing Type Tape;Transparent 6/29/2020  7:10 AM   Hub Color/Line Status Green 6/29/2020  7:10 AM        Opportunity for questions and clarification was provided.       Patient transported with:   O2 @ 2 liters  Registered Nurse

## 2020-06-29 NOTE — DISCHARGE INSTRUCTIONS
Discharge Instructions - SPINE    Weight Bearing Status:  Full weight bearing to both leg  DVT prophylaxis:  DO NOT take blood thinners until cleared by surgeon  Pain:  Continue analgesics as directed  Showering Instructions:  No showering for 3 days  May remove top dressing (yellow) after first shower  Keep under dressing (purple glue with mesh) in place. It will fall off on its own in about 2 weeks. Dressing Instructions:  Keep surgical incision clean and dry at all times. No soaking or scrubbing of wound at any time. Driving Instructions:  No driving until cleared by Orthopaedic Surgery. PT/OT:  No PT/OT required until directed by surgeon at followup appointment  Appt Instructions: Followup outpatient as previously scheduled  Contact the office sooner if you experience any increased numbness/tingling in the extremities of difficulty breathing/swallowing  Miscellaneous:  No lifting greater than 5 lbs. No strenuous exercise.   Continue to advance diet slowly  Maintain cervical collar at all times except bathing

## 2020-06-29 NOTE — ANESTHESIA POSTPROCEDURE EVALUATION
Post-Anesthesia Evaluation and Assessment    Cardiovascular Function/Vital Signs  Visit Vitals  /84   Pulse 95   Temp 36.1 °C (97 °F)   Resp 14   Ht 6' (1.829 m)   Wt 124.7 kg (275 lb)   SpO2 98%   BMI 37.30 kg/m²       Patient is status post Procedure(s):  ANTERIOR CERVICAL DISKECTOMY FUSION CERVICAL 3- CERVICAL 6 WITH C-ARM. Nausea/Vomiting: Controlled. Postoperative hydration reviewed and adequate. Pain:  Pain Scale 1: Numeric (0 - 10) (06/29/20 1230)  Pain Intensity 1: 3 (06/29/20 1230)   Managed. Neurological Status:   Neuro (WDL): Exceptions to WDL(generalized weakness) (06/29/20 1215)   At baseline. Mental Status and Level of Consciousness: Baseline and appropriate for discharge. Pulmonary Status:   O2 Device: Nasal cannula (06/29/20 1230)   Adequate oxygenation and airway patent. Complications related to anesthesia: None    Post-anesthesia assessment completed. No concerns. Patient has met all discharge requirements.     Signed By: Makeda Phillip MD    June 29, 2020

## 2020-06-29 NOTE — OP NOTES
Operative Report    Patient: Tamica Ansari MRN: [id number]  SSN: [id number]    YOB: 1965  Age: 49 y.o. Sex: male       Date of Surgery: 6/29/2020     Preoperative Diagnosis: OTHER SPONDYLOSIS MYELOPATHY CERVICAL REGION     Postoperative Diagnosis: OTHER SPONDYLOSIS MYELOPATHY CERVICAL REGION     Surgeon(s) and Role:     Lorin See MD - Primary    Anesthesia: General     Procedure: Procedure(s):  ANTERIOR CERVICAL DISKECTOMY FUSION CERVICAL 3- CERVICAL 6 WITH C-ARM     Procedure in Detail: After sterile preparation of the surgical field a timeout was conducted to ensure correct surgery, site and patient. Baseline MEPs EMGs and SSEPs were obtained. Due to pt. Body habitus the monitoring was only able to be followed for B/L UE. It was noted at baseline that only the forearms and hands MEPS were present and no elbow or shoulder MEPs were present. SSEPs were intact throughout Following incision was made over surgical site. Dissection was taken down to bone using sharp and dull dissection identifying all structures along the way and protecting vital structures. Once bone was exposed an 18G needle was placed into bone and fluoro was used to identify the correct level of C3-C4. After correct level was confirmed a rongeur was used to remove osteophytes from anterior cervical body. A shadow line retractor and pin retractors were used for retraction at correct level. Following a 15 blade was used to loosen the disc at said level. Using pituitary rongeurs and curets the disc was removed down to posterior cortical bone and PLL. At this point a 4.0 round cutting oscar was used to remove remaining bone posteriorly and to square off endplates of vertebre. Next a curret was used to create starting point in PLL. Following Kerosines were used to remove PLL and decompress centrally and laterally into the foramen. This was checked with nerve hook to ensure appropriate decompression.  Motors were then run which showed stable to baseline levels. Next a trial implant was used to measure correct fit. After controlling bleeding and washing wound with NS a 7mm cortical structural allograft filled with allograft putty was placed in the disc space. The same above steps were conducted at the C4/5 and C5/6 levels each taking a 5x7mm structural allograft filled with DBX putty. Next a depuy 51mm plate was placed anteriorly and secured with 14mm screws. and motors were checked again which showed now present MEPs in B/L shoulder and elbows and improvement in the baseline hand and wrist MEPs. SSEPs were unchanged. Fluoro was used to ensure appropriate placement of the plate and implant. After controlling bleeding again 40mg of depomedrol was introduced into the wound. A 7F BEN drain was placed and sewn in. Closure consisted of  2.0 vicryl interrupted. Finally skin was closed with 4.0 Monocryl running. Dressing consisted of prineo and Gauze with ioband. The patient did well the entire case and I was present for all portions of the case. Surgical assistant was required for opening/closing of wound and tissue retraction. Estimated Blood Loss:  50cc    Tourniquet Time: * No tourniquets in log *      Implants:   Implant Name Type Inv.  Item Serial No.  Lot No. LRB No. Used Action   GRAFT BNE INJCT MOLD 5 CC -- PRIME HD DBM - [id number]  GRAFT BNE INJCT MOLD 5 CC -- PRIME HD DBM [id number] [employer]CULOSKELET[employer] TRANS  N/A 1 Implanted   BONE SPCR CERV LORDS 7D 5MM -- VERTIGRAFT PRESERVON - [id number]  BONE SPCR CERV LORDS 7D 5MM -- VERTIGRAFT PRESERVON [id number] [facility] TISSUE [facility]  N/A 1 Implanted   BONE SPCR CERV LORDS 7D 5MM -- VERTIGRAFT PRESERVON - [id number]  BONE SPCR CERV LORDS 7D 5MM -- VERTIGRAFT PRESERVON [id number] [facility] TISSUE [facility]  N/A 1 Implanted   BONE SPCR CERV LORDS 7D 5MM -- VERTIGRAFT PRESERVON - [id number]  BONE SPCR CERV LORDS 7D 5MM -- VERTIGRAFT PRESERVON [id number] [facility] TISSUE BANK  N/A 1 Implanted   51 MM PLATE    DEPUY SPINE [id number] N/A 1 Implanted   SCR CERV ANTR VA SD 14MM TI -- SKYLINE - [id number]  SCR CERV ANTR VA SD 14MM TI -- SKYLINE  JNJ DEPUY SPINE [id number] N/A 8 Implanted               Specimens: * No specimens in log *        Drains: None                Complications: None    Counts: Sponge and needle counts were correct times two.     Signed By:  Bonita Ramirez MD     June 29, 2020

## 2020-06-29 NOTE — H&P
Subjective:  49 y.o. male Here for ACDF C3-C6. Pt. Notes that since previous evaluation there is been no changes in symptoms. He continues to have difficulty with motor in both upper and lower extremities. Labs:  No results found for this or any previous visit (from the past 24 hour(s)). Meds:    Current Facility-Administered Medications:     lactated Ringers infusion, 125 mL/hr, IntraVENous, CONTINUOUS, Titus Keane MD  Blood Culture:  No components found for: BLOODCX  Wound Culture:  No results found for: WOUNDCULT  Ins and Outs:  No intake/output data recorded. Physical Exam:  Visit Vitals  /85 (BP 1 Location: Left arm, BP Patient Position: At rest;Sitting)   Pulse 78   Temp 98.9 °F (37.2 °C)   Resp 18   Ht 6' (1.829 m)   Wt 124.7 kg (275 lb)   SpO2 100%   BMI 37.30 kg/m²     ? General: Awake, Alert, Oriented  ? Eyes: Pupils equal, round and reactive to light  ? Heart: regular rate and rhythm  ? Lungs: No audible wheezing  ? Abdomen: soft  Cervical   Dressing C/D/I   SILT L2-S1 Bilaterally:   Motor unchanged from previous evaluation with 2/5 strength in B/L LE and and 2-3/5 stregnth throughout B/L UE with exception of hands where he has 4/5 strength        Assessment:    ACDF C3-C6    Plan:  NPO  To OR for ACDF C3-C6  Pt. Understands risks and benefits as well as treatment options and wishes to proceed with surgery.     Luz Marrero MD

## 2020-06-30 ENCOUNTER — APPOINTMENT (OUTPATIENT)
Dept: GENERAL RADIOLOGY | Age: 55
End: 2020-06-30
Attending: ORTHOPAEDIC SURGERY
Payer: COMMERCIAL

## 2020-06-30 VITALS
BODY MASS INDEX: 37.25 KG/M2 | WEIGHT: 275 LBS | HEIGHT: 72 IN | HEART RATE: 73 BPM | DIASTOLIC BLOOD PRESSURE: 68 MMHG | SYSTOLIC BLOOD PRESSURE: 125 MMHG | OXYGEN SATURATION: 98 % | RESPIRATION RATE: 15 BRPM | TEMPERATURE: 98 F

## 2020-06-30 LAB
ANION GAP SERPL CALC-SCNC: 8 MMOL/L (ref 3–18)
BUN SERPL-MCNC: 9 MG/DL (ref 7–18)
BUN/CREAT SERPL: 33 (ref 12–20)
CALCIUM SERPL-MCNC: 8.5 MG/DL (ref 8.5–10.1)
CHLORIDE SERPL-SCNC: 107 MMOL/L (ref 100–111)
CO2 SERPL-SCNC: 26 MMOL/L (ref 21–32)
CREAT SERPL-MCNC: 0.27 MG/DL (ref 0.6–1.3)
ERYTHROCYTE [DISTWIDTH] IN BLOOD BY AUTOMATED COUNT: 16.6 % (ref 11.6–14.5)
GLUCOSE SERPL-MCNC: 212 MG/DL (ref 74–99)
HCT VFR BLD AUTO: 43.4 % (ref 36–48)
HGB BLD-MCNC: 13.4 G/DL (ref 13–16)
MCH RBC QN AUTO: 26.9 PG (ref 24–34)
MCHC RBC AUTO-ENTMCNC: 30.9 G/DL (ref 31–37)
MCV RBC AUTO: 87.1 FL (ref 74–97)
PLATELET # BLD AUTO: 314 K/UL (ref 135–420)
PMV BLD AUTO: 11.4 FL (ref 9.2–11.8)
POTASSIUM SERPL-SCNC: 4.4 MMOL/L (ref 3.5–5.5)
RBC # BLD AUTO: 4.98 M/UL (ref 4.7–5.5)
SODIUM SERPL-SCNC: 141 MMOL/L (ref 136–145)
WBC # BLD AUTO: 11.7 K/UL (ref 4.6–13.2)

## 2020-06-30 PROCEDURE — 74011250637 HC RX REV CODE- 250/637: Performed by: ORTHOPAEDIC SURGERY

## 2020-06-30 PROCEDURE — 97530 THERAPEUTIC ACTIVITIES: CPT

## 2020-06-30 PROCEDURE — 80048 BASIC METABOLIC PNL TOTAL CA: CPT

## 2020-06-30 PROCEDURE — 36415 COLL VENOUS BLD VENIPUNCTURE: CPT

## 2020-06-30 PROCEDURE — 74011250636 HC RX REV CODE- 250/636: Performed by: ORTHOPAEDIC SURGERY

## 2020-06-30 PROCEDURE — 72040 X-RAY EXAM NECK SPINE 2-3 VW: CPT

## 2020-06-30 PROCEDURE — 99218 HC RM OBSERVATION: CPT

## 2020-06-30 PROCEDURE — 85027 COMPLETE CBC AUTOMATED: CPT

## 2020-06-30 RX ADMIN — DEXAMETHASONE SODIUM PHOSPHATE 4 MG: 4 INJECTION, SOLUTION INTRAMUSCULAR; INTRAVENOUS at 06:11

## 2020-06-30 RX ADMIN — OXYCODONE 5 MG: 5 TABLET ORAL at 03:17

## 2020-06-30 RX ADMIN — OXYCODONE 5 MG: 5 TABLET ORAL at 08:10

## 2020-06-30 RX ADMIN — ACETAMINOPHEN 650 MG: 325 TABLET ORAL at 08:10

## 2020-06-30 RX ADMIN — VANCOMYCIN HYDROCHLORIDE 1750 MG: 10 INJECTION, POWDER, LYOPHILIZED, FOR SOLUTION INTRAVENOUS at 00:08

## 2020-06-30 RX ADMIN — DOCUSATE SODIUM 100 MG: 100 CAPSULE, LIQUID FILLED ORAL at 08:10

## 2020-06-30 RX ADMIN — ACETAMINOPHEN 650 MG: 325 TABLET ORAL at 03:17

## 2020-06-30 NOTE — PROGRESS NOTES
Problem: Mobility Impaired (Adult and Pediatric)  Goal: *Acute Goals and Plan of Care (Insert Text)  Description: Physical Therapy Goals   Initiated 6/29/2020 and to be accomplished within 2 day(s)  1. Patient will move from supine <> sit with total assist for change of position. 2.  Patient will perform demonstrate static sitting balance EOB with moderate/max assist.     Outcome: Progressing Towards Goal   PHYSICAL THERAPY TREATMENT    Patient: Mariam Dorman (31 y.o. male)  Date: 6/30/2020  Diagnosis: Cervical myelopathy ([facility] [address] [G95.9]   <principal problem not specified>  Procedure(s) (LRB):  ANTERIOR CERVICAL DISKECTOMY FUSION CERVICAL 3- CERVICAL 6 WITH C-ARM (N/A) 1 Day Post-Op  Precautions: Fall, Spinal, Other (comment)  PLOF: lives alone, has aids and family that assist, uses standing frame to get in/out of electric w/c, non ambulatory     ASSESSMENT: Pt is completely dependent  Nursing retrieved nadeen lift. Got pt dressed, shoes donned while waiting. Total A for log rolling to pull up pants and place nadeen pad under pt. Transferred into w/c, increased time needed for proper positioning in chair. Donned shirt and removed pad. All pt possessions with pt. Progression toward goals:   []      Improving appropriately and progressing toward goals  []      Improving slowly and progressing toward goals  []      Not making progress toward goals and plan of care will be adjusted     PLAN:  Patient continues to benefit from skilled intervention to address the above impairments. Continue treatment per established plan of care. Discharge Recommendations:  home with personal aides  Further Equipment Recommendations for Discharge:  N/A     SUBJECTIVE:   Patient stated Do you have the chair lift?     OBJECTIVE DATA SUMMARY:   Critical Behavior:  Neurologic State: Appropriate for age, Alert  Orientation Level: Oriented X4  Cognition: Follows commands  Safety/Judgement: Awareness of environment  Functional Mobility Training:  Bed Mobility:  Rolling: Total assistance;Assist x2  Transfers:  Bed to Chair: Total assistance(nadeen lift)     Pain:  Pain level pre-treatment: 0/10  Pain level post-treatment: 0/10   Pain Intervention(s): Medication (see MAR); Rest, Ice, Repositioning   Response to intervention: Nurse notified, See doc flow    Activity Tolerance: At baseline  Please refer to the flowsheet for vital signs taken during this treatment. After treatment:   [x] Patient left in no apparent distress sitting up in chair  [] Patient left in no apparent distress in bed  [] Call bell left within reach  [] Nursing notified  [] Caregiver present  [] Bed alarm activated  [] SCDs applied      COMMUNICATION/EDUCATION:   []         Role of Physical Therapy in the acute care setting. []         Fall prevention education was provided and the patient/caregiver indicated understanding. []         Patient/family have participated as able in working toward goals and plan of care. []         Patient/family agree to work toward stated goals and plan of care. [x]         Patient understands intent and goals of therapy, but is neutral about his/her participation.   []         Patient is unable to participate in stated goals/plan of care: ongoing with therapy staff.  []         Other:        Cas Cavazos PTA   Time Calculation: 45 mins

## 2020-06-30 NOTE — PROGRESS NOTES
5275  Assumed care of pt at this time. Assessment complete. Pt alert and oriented x 4. Shows no sign of distress. Fall risk arm band in place. Denies SOB and chest pain. Pt lungs clear bilaterally. Cap refill  less than 3 seconds. Pt denies numbness and tingling to all extremities. Stated pain 0/10. Pt has 18 G IV to L forearm. Pt has ioban dressing to anterior neck CDI . SCDS in place pt own TEDs stockings in place. Incentive spirometer at bedside. Pt encouraged to continue use of IS. Pt verbalized understanding. Ice pack applied. Call light and possessions within reach. Bed locked and in low position. Will continue to monitor. 0850  Pt transported down to xray    0926  Pt returned to room at this time    1225  Pt transferred safely to personal wheelchair via nadeen lift. 1230  Dual AVS reviewed with Elizabeth GRIMALDO RN. All medications reviewed individually with patient. Opportunities for questions and concerns provided. Patient to be discharged via (mode of transport ie. Car, ambulance or air transport) car. Patient's arm band appropriately discarded.         1235  Pt being d/c to main entrance at this time

## 2020-06-30 NOTE — PROGRESS NOTES
Subjective:  49 y.o. male post operative day 1 Status Post ACDF C3-C6. Pt. Doing well. Notes improvement in tingling sensation in hands B/L. He feels that his motor is at baseline. No O/N events. Pain well controlled.     Labs:  Recent Results (from the past 24 hour(s))   TYPE & SCREEN    Collection Time: 06/29/20  7:00 AM   Result Value Ref Range    Crossmatch Expiration 07/02/2020     ABO/Rh(D) Antonio Pyo POSITIVE     Antibody screen NEG    GLUCOSE, POC    Collection Time: 06/29/20  7:08 AM   Result Value Ref Range    Glucose (POC) 137 (H) 70 - 477 mg/dL   METABOLIC PANEL, BASIC    Collection Time: 06/30/20  3:00 AM   Result Value Ref Range    Sodium 141 136 - 145 mmol/L    Potassium 4.4 3.5 - 5.5 mmol/L    Chloride 107 100 - 111 mmol/L    CO2 26 21 - 32 mmol/L    Anion gap 8 3.0 - 18 mmol/L    Glucose 212 (H) 74 - 99 mg/dL    BUN 9 7.0 - 18 MG/DL    Creatinine 0.27 (L) 0.6 - 1.3 MG/DL    BUN/Creatinine ratio 33 (H) 12 - 20      GFR est AA >60 >60 ml/min/1.73m2    GFR est non-AA >60 >60 ml/min/1.73m2    Calcium 8.5 8.5 - 10.1 MG/DL   CBC W/O DIFF    Collection Time: 06/30/20  3:00 AM   Result Value Ref Range    WBC 11.7 4.6 - 13.2 K/uL    RBC 4.98 4.70 - 5.50 M/uL    HGB 13.4 13.0 - 16.0 g/dL    HCT 43.4 36.0 - 48.0 %    MCV 87.1 74.0 - 97.0 FL    MCH 26.9 24.0 - 34.0 PG    MCHC 30.9 (L) 31.0 - 37.0 g/dL    RDW 16.6 (H) 11.6 - 14.5 %    PLATELET 984 519 - 071 K/uL    MPV 11.4 9.2 - 11.8 FL     Meds:    Current Facility-Administered Medications:     lactated Ringers infusion, 125 mL/hr, IntraVENous, CONTINUOUS, Titus Keane MD, Last Rate: 125 mL/hr at 06/29/20 6106    lactated Ringers infusion, 50 mL/hr, IntraVENous, CONTINUOUS, Milana Walsh MD    oxyCODONE-acetaminophen (PERCOCET) 5-325 mg per tablet 1 Tab, 1 Tab, Oral, PRN, Milana Walsh MD    fentaNYL citrate (PF) injection 25 mcg, 25 mcg, IntraVENous, Q10MIN PRN, Milana Walsh MD, 25 mcg at 06/29/20 1220    HYDROmorphone (DILAUDID) injection 0.5 mg, 0.5 mg, IntraVENous, Q10MIN PRN, Vishal Barroso MD    naloxone [facility]) injection 0.1 mg, 0.1 mg, IntraVENous, EVERY 2 MINUTES AS NEEDED, Vishal Barroso MD    sodium chloride (NS) flush 5-40 mL, 5-40 mL, IntraVENous, Q8H, Titus Keane MD    sodium chloride (NS) flush 5-40 mL, 5-40 mL, IntraVENous, PRN, Nayeli Paulson MD    acetaminophen (TYLENOL) tablet 650 mg, 650 mg, Oral, Q4H, Nayeli Paulson MD, 650 mg at 06/30/20 0317    oxyCODONE IR (ROXICODONE) tablet 5 mg, 5 mg, Oral, Q4H, Nayeli Paulson MD, 5 mg at 06/30/20 4418    morphine injection 2 mg, 2 mg, IntraVENous, Q4H PRN, Nayeli Paulson MD    naloxone [facility]) injection 0.4 mg, 0.4 mg, IntraVENous, PRN, Nayeli Paulson MD    ondansetron [facility]) injection 4 mg, 4 mg, IntraVENous, Q4H PRN, Nayeli Paulson MD    phenol throat spray (CHLORASEPTIC) 1 Spray, 1 Spray, Oral, PRN, Nayeli Paulson MD    benzocaine-menthoL (CEPACOL) lozenge 1 Lozenge, 1 Lozenge, Oral, PRN, Nayeli Paulson MD    diphenhydrAMINE (BENADRYL) injection 12.5 mg, 12.5 mg, IntraVENous, Q4H PRN, Nayeli Paulson MD    docusate sodium (COLACE) capsule 100 mg, 100 mg, Oral, BID, Nayeli Paulson MD, 100 mg at 06/29/20 2308    dexamethasone (DECADRON) 4 mg/mL injection 4 mg, 4 mg, IntraVENous, Q8H, Titus Keane MD, 4 mg at 06/29/20 2308  Blood Culture:  No components found for: BLOODCX  Wound Culture:  No results found for: WOUNDCULT  Ins and Outs:  06/29 1901 - 06/30 0700  In: 1700 [P.O.:1700]  Out: 2025 [Urine:2025]       Physical Exam:  Visit Vitals  /80 (BP 1 Location: Left arm, BP Patient Position: At rest)   Pulse 95   Temp 98 °F (36.7 °C)   Resp 20   Ht 6' (1.829 m)   Wt 124.7 kg (275 lb)   SpO2 98%   BMI 37.30 kg/m²     ? General: Awake, Alert, Oriented  ? Eyes: Pupils equal, round and reactive to light  ? Heart: regular rate and rhythm  ? Lungs: No audible wheezing  ?  Abdomen: soft  Cervical   Dressing C/D/I   Sensation: slightly better in UE compared to baseline   Motor: at baseline with motion only in hands/wrists and toes.           Assessment:    POD 1 From above surgery Doing well    Plan:  Weight bearing as tolerated all extremities  Up and out of bed  DVT prophylaxis- mechanical  Antibiotics: complete course  Drain : remove today  Pain control: analgesics  PT/OT  Discharge planning in progress    Bhakti Ramírez MD

## 2020-06-30 NOTE — PROGRESS NOTES
Transition of Care ([facility]) Plan:     Chart reviewed, met with pt in room. Introduced CM and role to pt, verified discharge address via face sheet. Pt planning discharge home, has assistance lined up at home for the next few weeks. Pt states feeling better since surgery, no needs voiced, will follow up with MD as scheduled. Pt is having motorized wheelchair brought to hospital and will call cab when he is ready for discharge; he has assistance waiting for him at home. [facility] Transportation:   How is patient being transported at discharge? Family/Friend      When? Between 12-2p     Is transport scheduled? N/A      Follow-up appointment and transportation:   PCP/Specialist?  See AVS for Appointment         Who is transporting to the follow-up appointment? Family/Friend      Is transport for follow up appointment scheduled? N/A    Communication plan (with patient/family): Who is being called? Patient or Next of Kin? Responsible party? Patient      What number(s) is to be used? See Facesheet      What service provider is calling for [facility] services? When are they calling? 24-48 hours following discharge    Readmission Risk? (Green/Low; Yellow/Moderate; Red/High):  Green    Care Management Interventions  PCP Verified by CM:  Yes  Transition of Care Consult (CM Consult): Discharge Planning  Physical Therapy Consult: Yes  Occupational Therapy Consult: Yes  Current Support Network: Has Personal Caregivers, Own Home, Lives Alone  Confirm Follow Up Transport: Friends  Discharge Location  Discharge Placement: Home

## (undated) DEVICE — 3.0MM PRECISION NEURO (MATCH HEAD)

## (undated) DEVICE — PACK PROCEDURE SURG ANTR CERV DISCECTOMY

## (undated) DEVICE — 3.0MM FINE DIAMOND

## (undated) DEVICE — DRAPE,SPLIT ,77X120: Brand: MEDLINE

## (undated) DEVICE — PACKING 8004004 NEURAY 200PK 13X38MM: Brand: NEURAY ®

## (undated) DEVICE — SYSTEM SKIN CLSR 22CM DERMBND PRINEO

## (undated) DEVICE — REM POLYHESIVE ADULT PATIENT RETURN ELECTRODE: Brand: VALLEYLAB

## (undated) DEVICE — SHEET,DRAPE,70X85,STERILE: Brand: MEDLINE

## (undated) DEVICE — DRAIN SURG 10FR L1/8IN RND CHN FULL FLUT HEAT POLISHED PERF

## (undated) DEVICE — MEDI-VAC NON-CONDUCTIVE SUCTION TUBING: Brand: CARDINAL HEALTH

## (undated) DEVICE — SYR 20ML LL STRL LF --

## (undated) DEVICE — BAG PRESSURE INFUSION 3 W STOPCOCK 3000 CC PREMIERPRO DISP

## (undated) DEVICE — SUT MONOCRYL PLUS UD 4-0 --

## (undated) DEVICE — 1010 S-DRAPE TOWEL DRAPE 10/BX: Brand: STERI-DRAPE™

## (undated) DEVICE — SUTURE MCRYL SZ 5-0 L18IN ABSRB UD L19MM PS-2 3/8 CIR PRIM Y495G

## (undated) DEVICE — SUTURE VCRL + SZ 1 L18IN ABSRB UD L36MM CT-1 1/2 CIR VCP841D

## (undated) DEVICE — PREP SKN CHLRAPRP APL 26ML STR --

## (undated) DEVICE — DISPOSABLE HARDY BAYONET INSULATED BIPOLAR FORCEPS: Brand: INTEGRA® JARIT®

## (undated) DEVICE — FLOSEAL MATRIX IS INDICATED IN SURGICAL PROCEDURES (OTHER THAN IN OPHTHALMIC) AS AN ADJUNCT TO HEMOSTASIS WHEN CONTROL OF BLEEDING BY LIGATURE OR CONVENTIONALPROCEDURES IS INEFFECTIVE OR IMPRACTICAL.: Brand: FLOSEAL HEMOSTATIC MATRIX

## (undated) DEVICE — C-ARMOR C-ARM EQUIPMENT COVERS CLEAR STERILE UNIVERSAL FIT 12 PER CASE: Brand: C-ARMOR

## (undated) DEVICE — SUT VCRL + 2-0 36IN CT1 UD --

## (undated) DEVICE — 3M™ IOBAN™ 2 ANTIMICROBIAL INCISE DRAPE 6650EZ: Brand: IOBAN™ 2

## (undated) DEVICE — SUTURE VCRL + 3-0 L27IN ABSRB UD PS-2 L19MM 3/8 CIR PRIM VCP427H

## (undated) DEVICE — TUBING, SUCTION, 1/4" X 12', STRAIGHT: Brand: MEDLINE

## (undated) DEVICE — FLOSEAL HEMOSTATIC MATRIX, 10ML: Brand: FLOSEAL HEMOSTATIC MATRIX

## (undated) DEVICE — SUT ETHLN 2-0 18IN FS BLK --

## (undated) DEVICE — SPONGE GZ W4XL4IN COT 12 PLY TYP VII WVN C FLD DSGN

## (undated) DEVICE — CATH IV AUTOGRD ORN 14GA 45MM -- INSYTE-N

## (undated) DEVICE — SYR 10ML LUER LOK 1/5ML GRAD --

## (undated) DEVICE — SOL IRRIGATION INJ NACL 0.9% 500ML BTL

## (undated) DEVICE — SURGIFOAM SPNG SZ 100

## (undated) DEVICE — SPONGE HEMSTAT SURGCEL 2X4IN -- SURGIFOAM

## (undated) DEVICE — PREP CHLORAPREP 10.5 ML ORG --

## (undated) DEVICE — Device

## (undated) DEVICE — STERILE POLYISOPRENE POWDER-FREE SURGICAL GLOVES WITH EMOLLIENT COATING: Brand: PROTEXIS

## (undated) DEVICE — TRAY PREP DRY W/ PREM GLV 2 APPL 6 SPNG 2 UNDPD 1 OVERWRAP

## (undated) DEVICE — INSULATED BLADE ELECTRODE: Brand: EDGE

## (undated) DEVICE — TABLE COVER: Brand: CONVERTORS

## (undated) DEVICE — GARMENT,MEDLINE,DVT,INT,CALF,MED, GEN2: Brand: MEDLINE

## (undated) DEVICE — STERILE POLYISOPRENE POWDER-FREE SURGICAL GLOVES: Brand: PROTEXIS

## (undated) DEVICE — BLADE ELECTRODE: Brand: EDGE

## (undated) DEVICE — RESERVOIR,SUCTION,100CC,SILICONE: Brand: MEDLINE

## (undated) DEVICE — GAUZE,SPONGE,8"X4",12PLY,XRAY,STRL,LF: Brand: MEDLINE